# Patient Record
Sex: MALE | Race: OTHER | NOT HISPANIC OR LATINO | URBAN - METROPOLITAN AREA
[De-identification: names, ages, dates, MRNs, and addresses within clinical notes are randomized per-mention and may not be internally consistent; named-entity substitution may affect disease eponyms.]

---

## 2024-05-16 ENCOUNTER — EMERGENCY (EMERGENCY)
Facility: HOSPITAL | Age: 29
LOS: 1 days | Discharge: HOME | End: 2024-05-16
Attending: EMERGENCY MEDICINE
Payer: MEDICAID

## 2024-05-16 VITALS
WEIGHT: 178.57 LBS | TEMPERATURE: 98 F | DIASTOLIC BLOOD PRESSURE: 70 MMHG | RESPIRATION RATE: 18 BRPM | HEART RATE: 90 BPM | OXYGEN SATURATION: 98 % | SYSTOLIC BLOOD PRESSURE: 130 MMHG

## 2024-05-16 PROCEDURE — 99053 MED SERV 10PM-8AM 24 HR FAC: CPT

## 2024-05-16 PROCEDURE — 73610 X-RAY EXAM OF ANKLE: CPT | Mod: 26,50

## 2024-05-16 PROCEDURE — 99285 EMERGENCY DEPT VISIT HI MDM: CPT

## 2024-05-16 PROCEDURE — 96372 THER/PROPH/DIAG INJ SC/IM: CPT

## 2024-05-16 PROCEDURE — 72125 CT NECK SPINE W/O DYE: CPT | Mod: MC

## 2024-05-16 PROCEDURE — 99284 EMERGENCY DEPT VISIT MOD MDM: CPT | Mod: 25

## 2024-05-16 PROCEDURE — 70450 CT HEAD/BRAIN W/O DYE: CPT | Mod: MC

## 2024-05-16 PROCEDURE — 73110 X-RAY EXAM OF WRIST: CPT | Mod: 26,50

## 2024-05-16 PROCEDURE — 70450 CT HEAD/BRAIN W/O DYE: CPT | Mod: 26,MC

## 2024-05-16 PROCEDURE — 73110 X-RAY EXAM OF WRIST: CPT

## 2024-05-16 PROCEDURE — 72125 CT NECK SPINE W/O DYE: CPT | Mod: 26,MC

## 2024-05-16 PROCEDURE — 73610 X-RAY EXAM OF ANKLE: CPT

## 2024-05-16 RX ORDER — METHOCARBAMOL 500 MG/1
2 TABLET, FILM COATED ORAL
Qty: 60 | Refills: 0
Start: 2024-05-16 | End: 2024-05-30

## 2024-05-16 RX ORDER — METHOCARBAMOL 500 MG/1
750 TABLET, FILM COATED ORAL ONCE
Refills: 0 | Status: COMPLETED | OUTPATIENT
Start: 2024-05-16 | End: 2024-05-16

## 2024-05-16 RX ORDER — KETOROLAC TROMETHAMINE 30 MG/ML
15 SYRINGE (ML) INJECTION ONCE
Refills: 0 | Status: DISCONTINUED | OUTPATIENT
Start: 2024-05-16 | End: 2024-05-16

## 2024-05-16 RX ORDER — LIDOCAINE 4 G/100G
1 CREAM TOPICAL
Qty: 3 | Refills: 0
Start: 2024-05-16 | End: 2024-05-30

## 2024-05-16 RX ADMIN — METHOCARBAMOL 750 MILLIGRAM(S): 500 TABLET, FILM COATED ORAL at 03:06

## 2024-05-16 RX ADMIN — Medication 15 MILLIGRAM(S): at 03:07

## 2024-05-16 NOTE — ED PROVIDER NOTE - CARE PLAN
Principal Discharge DX:	Arm numbness  Secondary Diagnosis:	Head trauma  Secondary Diagnosis:	Wrist sprain  Secondary Diagnosis:	Ankle sprain   1

## 2024-05-16 NOTE — ED PROVIDER NOTE - PATIENT PORTAL LINK FT
You can access the FollowMyHealth Patient Portal offered by [facility] by registering at the following website: [email or web address]. By joining [email or web address]’s FollowMyHealth portal, you will also be able to view your health information using other applications (apps) compatible with our system.

## 2024-05-16 NOTE — ED ADULT TRIAGE NOTE - NS ED TRIAGE AVPU SCALE
Discharge instructions reviewed, understood, and signed by patient. VSS, PIV removed, new medications reviewed and understood, patient has all belongings. Patient will ambulate off  Unit with family.    Alert-The patient is alert, awake and responds to voice. The patient is oriented to time, place, and person. The triage nurse is able to obtain subjective information.
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
## 2024-05-16 NOTE — ED PROVIDER NOTE - NSFOLLOWUPINSTRUCTIONS_ED_ALL_ED_FT
There is no broken bone on your x-ray.   The swelling is what is causing you pain right now so please do the following things to improve your swelling and you will get better faster.    If you are not getting better or are getting worse, please follow up with a specialist - orthopedics or podiatry - or you can come back to the ER.    Tips to heal your Sprain: R.I.C.E!  Rest. Ice. Compression. Elevation.   Rest   Ice  - 5-6 times a day, 20 minutes each time.  Buy 2 bags of frozen peas that nicely take the shape of most joints, once one starts to defrost put it back in the freeze and take out the other one  Compression - An Ace bandage or a pre made splint you can buy in the store or we might have to provide for you  Elevation- Keep your injured joint up, so the swelling can go down with gravity.  Up on pillows or a couch etc.    Pain Relief:  Take Ibuprofen 600 mg every 6 hours.  If you're still having pain, you can also take Tylenol 650 mg every 6 hours in addition.
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
## 2024-05-16 NOTE — ED PROVIDER NOTE - PHYSICAL EXAMINATION
General: mild distress, appears stated age  HEENT: normocephalic, abrasions and bruising on scalp  Respiratory: normal work of breathing  MSK/Skin: warm, dry, abrasions on scalp, wrists bilaterally, palm, swelling of left hand, swelling of ankles bilaterally, 6 In x 3 in abarsions on right knee  Neuro: A&Ox3, cranial nerves II-XII intact, 5/5 strength in all extremities, no sensory deficits, normal gait   Psych: appropriate affect